# Patient Record
Sex: MALE | Race: OTHER | HISPANIC OR LATINO | URBAN - METROPOLITAN AREA
[De-identification: names, ages, dates, MRNs, and addresses within clinical notes are randomized per-mention and may not be internally consistent; named-entity substitution may affect disease eponyms.]

---

## 2017-12-15 ENCOUNTER — EMERGENCY (EMERGENCY)
Facility: HOSPITAL | Age: 37
LOS: 1 days | Discharge: HOME | End: 2017-12-15
Attending: EMERGENCY MEDICINE
Payer: COMMERCIAL

## 2017-12-15 VITALS
DIASTOLIC BLOOD PRESSURE: 110 MMHG | TEMPERATURE: 100 F | SYSTOLIC BLOOD PRESSURE: 167 MMHG | OXYGEN SATURATION: 100 % | HEART RATE: 100 BPM | RESPIRATION RATE: 18 BRPM

## 2017-12-15 VITALS — WEIGHT: 242.95 LBS | HEIGHT: 70 IN

## 2017-12-15 PROCEDURE — 99283 EMERGENCY DEPT VISIT LOW MDM: CPT | Mod: 25

## 2017-12-15 PROCEDURE — 99284 EMERGENCY DEPT VISIT MOD MDM: CPT

## 2017-12-15 PROCEDURE — T1013: CPT

## 2017-12-15 PROCEDURE — 96372 THER/PROPH/DIAG INJ SC/IM: CPT

## 2017-12-15 RX ORDER — TRAMADOL HYDROCHLORIDE 50 MG/1
1 TABLET ORAL
Qty: 15 | Refills: 0 | OUTPATIENT
Start: 2017-12-15 | End: 2017-12-18

## 2017-12-15 RX ORDER — OXYCODONE AND ACETAMINOPHEN 5; 325 MG/1; MG/1
2 TABLET ORAL EVERY 4 HOURS
Qty: 0 | Refills: 0 | Status: DISCONTINUED | OUTPATIENT
Start: 2017-12-15 | End: 2017-12-15

## 2017-12-15 RX ORDER — DIAZEPAM 5 MG
10 TABLET ORAL ONCE
Qty: 0 | Refills: 0 | Status: DISCONTINUED | OUTPATIENT
Start: 2017-12-15 | End: 2017-12-15

## 2017-12-15 RX ORDER — KETOROLAC TROMETHAMINE 30 MG/ML
60 SYRINGE (ML) INJECTION ONCE
Qty: 0 | Refills: 0 | Status: DISCONTINUED | OUTPATIENT
Start: 2017-12-15 | End: 2017-12-15

## 2017-12-15 RX ORDER — DEXAMETHASONE 0.5 MG/5ML
4 ELIXIR ORAL ONCE
Qty: 0 | Refills: 0 | Status: COMPLETED | OUTPATIENT
Start: 2017-12-15 | End: 2017-12-15

## 2017-12-15 RX ADMIN — Medication 4 MILLIGRAM(S): at 16:31

## 2017-12-15 RX ADMIN — Medication 60 MILLIGRAM(S): at 16:37

## 2017-12-15 RX ADMIN — Medication 10 MILLIGRAM(S): at 16:31

## 2017-12-15 RX ADMIN — Medication 60 MILLIGRAM(S): at 16:01

## 2017-12-15 RX ADMIN — OXYCODONE AND ACETAMINOPHEN 2 TABLET(S): 5; 325 TABLET ORAL at 18:43

## 2017-12-15 RX ADMIN — OXYCODONE AND ACETAMINOPHEN 2 TABLET(S): 5; 325 TABLET ORAL at 18:42

## 2017-12-15 NOTE — ED STATDOCS - MEDICAL DECISION MAKING DETAILS
Will give anti inflammatory and muscle relaxers. Does not require imaging because he recently had an MRI and this is an exacerbation of a previously known injury. No signs of trauma, neurologically intact. Will assess pain, reassess and have pt f/u with pain management physician.

## 2017-12-15 NOTE — ED STATDOCS - SKIN, MLM
SWELLING AT THE L FLANK WITH REDNESS. Normal temperature, no rashes, abrasions, lacerations or ecchymosis.

## 2017-12-15 NOTE — ED STATDOCS - NS ED ROS FT
Denies HA, dizziness, blurry vision, sore throat, coughing, SOB, CP, nausea, vomiting, abdominal pain, flank pain, diarrhea, constipation, blood in stool, urinary frequency/urgency/dysuria, hematuria, LE edema, numbness, weakness or rashes.

## 2017-12-15 NOTE — ED STATDOCS - CARE PLAN
Principal Discharge DX:	Herniated intervertebral disc of lumbar spine  Instructions for follow-up, activity and diet:	Continue with pain medication and F/U with Spine physician

## 2017-12-15 NOTE — ED STATDOCS - NEUROLOGICAL, MLM
Full sensation and 5/5 strength is normal. He is in a lot of pain so unable to obtain full neuro exam.

## 2017-12-15 NOTE — ED STATDOCS - PROGRESS NOTE DETAILS
Pt treated with Percocet per his pain and he feels a lot better. Pt D/C in neurological stable condition and will F/u with his spine Physician as discussed.

## 2017-12-15 NOTE — ED ADULT NURSE NOTE - OBJECTIVE STATEMENT
PT came in c/o back pain x2 weeks, PT c/o L sided flank pain. PT denies incontinence, difficulty urinating, or pain on urination. PT also denies N/V, HA, and dizziness.

## 2017-12-15 NOTE — ED STATDOCS - OBJECTIVE STATEMENT
This is a 38 y/o M pt with a pmhx of three herniated discs in his back presents to the ED c/o back pain worsening x3 weeks. Reports that at the beginning of this pain he came down from a ladder and had the pain onset. States that his pain radiates down his legs and is worse when he walks and moves. Describes his pain is worse on the L lower back side. Last week the area of his back was red and swollen where he went to Dr. Blake who gave him and anti-inflammatory medication. He is a non smoker, non drinker and uses no illicit drugs. Denies recent trauma, heavy lifting, urinary frequency, hematuria, headache, dizziness, fever, chills, sore throat, cough, n/v/d/c, peripheral edema, chest pain, sob, sick contacts, recent travel, rashes or any other complaints. NKDA. 4 months ago he had an MRI which revealed the herniated discs. He was told to rest and use a back brace.

## 2017-12-15 NOTE — ED STATDOCS - MUSCULOSKELETAL FINDINGS, MLM
TENDERNESS IN THE PIRIFORMIS. TENDER AT THE PARASPINAL LUMBAR TENDERNESS. HYPERTONICITY AT THE LOWER PARASPINAL BACK. MIDLINE LUMBAR TENDERNESS./TENDERNESS/neck supple/RANGE OF MOTION LIMITED/motor intact

## 2017-12-15 NOTE — ED STATDOCS - ATTENDING CONTRIBUTION TO CARE
I, Ruthann Chowdhury, performed the initial face to face bedside interview with this patient regarding history of present illness, review of symptoms and relevant past medical, social and family history.  I completed an independent physical examination.  I was the initial provider who evaluated this patient. I have signed out the follow up of any pending tests (i.e. labs, radiological studies) to the ACP.  I have communicated the patient’s plan of care and disposition with the ACP.